# Patient Record
Sex: FEMALE | Race: WHITE
[De-identification: names, ages, dates, MRNs, and addresses within clinical notes are randomized per-mention and may not be internally consistent; named-entity substitution may affect disease eponyms.]

---

## 2017-01-07 ENCOUNTER — HOSPITAL ENCOUNTER (INPATIENT)
Dept: HOSPITAL 10 - FTE | Age: 52
LOS: 1 days | Discharge: HOME | DRG: 690 | End: 2017-01-08
Attending: INTERNAL MEDICINE | Admitting: INTERNAL MEDICINE
Payer: MEDICAID

## 2017-01-07 VITALS — TEMPERATURE: 99.7 F

## 2017-01-07 VITALS
HEIGHT: 62 IN | WEIGHT: 169.54 LBS | WEIGHT: 169.54 LBS | BODY MASS INDEX: 31.2 KG/M2 | BODY MASS INDEX: 31.2 KG/M2 | HEIGHT: 62 IN

## 2017-01-07 DIAGNOSIS — N13.6: Primary | ICD-10-CM

## 2017-01-07 DIAGNOSIS — R16.0: ICD-10-CM

## 2017-01-07 DIAGNOSIS — B95.7: ICD-10-CM

## 2017-01-07 DIAGNOSIS — N17.9: ICD-10-CM

## 2017-01-07 DIAGNOSIS — Z87.442: ICD-10-CM

## 2017-01-07 LAB
ADD UMIC: YES
ALBUMIN SERPL-MCNC: 3.7 G/DL (ref 3.3–4.9)
ALBUMIN/GLOB SERPL: 0.74 {RATIO}
ALP SERPL-CCNC: 209 IU/L (ref 42–121)
ALT SERPL-CCNC: 29 IU/L (ref 13–69)
ANION GAP SERPL CALC-SCNC: 20 MMOL/L (ref 8–16)
APTT BLD: 39.1 SEC (ref 25–35)
AST SERPL-CCNC: 37 IU/L (ref 15–46)
BACTERIA #/AREA URNS HPF: (no result) /[HPF]
BASOPHILS # BLD AUTO: 0 10^3/UL (ref 0–0.1)
BASOPHILS NFR BLD: 0.1 % (ref 0–2)
BILIRUB DIRECT SERPL-MCNC: 0 MG/DL (ref 0–0.2)
BILIRUB SERPL-MCNC: 0.2 MG/DL (ref 0.2–1.3)
BUN SERPL-MCNC: 15 MG/DL (ref 7–20)
CALCIUM SERPL-MCNC: 9.2 MG/DL (ref 8.4–10.2)
CHLORIDE SERPL-SCNC: 103 MMOL/L (ref 97–110)
CO2 SERPL-SCNC: 24 MMOL/L (ref 21–31)
COLOR UR: (no result)
CONDITION: 1
CREAT SERPL-MCNC: 1.17 MG/DL (ref 0.44–1)
EOSINOPHIL # BLD: 0 10^3/UL (ref 0–0.5)
EOSINOPHIL NFR BLD: 0.2 % (ref 0–7)
ERYTHROCYTE [DISTWIDTH] IN BLOOD BY AUTOMATED COUNT: 15.1 % (ref 11.5–14.5)
GLOBULIN SER-MCNC: 5 G/DL (ref 1.3–3.2)
GLUCOSE SERPL-MCNC: 101 MG/DL (ref 70–220)
GLUCOSE UR STRIP-MCNC: NEGATIVE %
HCT VFR BLD CALC: 33.7 % (ref 37–47)
HGB BLD-MCNC: 11.3 G/DL (ref 12–16)
INR PPP: 1.24
KETONES UR STRIP.AUTO-MCNC: 15 MG/DL
LH ANALYZER COMMENTS: 1
LYMPHOCYTES # BLD AUTO: 1.6 10^3/UL (ref 0.8–2.9)
LYMPHOCYTES NFR BLD AUTO: 14.1 % (ref 15–51)
MCH RBC QN AUTO: 30.9 PG (ref 29–33)
MCHC RBC AUTO-ENTMCNC: 33.6 G/DL (ref 32–37)
MCV RBC AUTO: 91.7 FL (ref 82–101)
MONOCYTES # BLD: 1.1 10^3/UL (ref 0.3–0.9)
MONOCYTES NFR BLD: 9.9 % (ref 0–11)
NEUTROPHILS # BLD: 8.6 10^3/UL (ref 1.6–7.5)
NEUTROPHILS NFR BLD AUTO: 75.7 % (ref 39–77)
NITRITE UR QL STRIP.AUTO: NEGATIVE
NRBC # BLD MANUAL: 0 10^3/UL (ref 0–0)
NRBC BLD QL: 0 /100WBC (ref 0–0)
PLATELET # BLD: 378 10^3/UL (ref 140–440)
PMV BLD AUTO: 7.7 FL (ref 7.4–10.4)
POTASSIUM SERPL-SCNC: 4.8 MMOL/L (ref 3.5–5.1)
PROT SERPL-MCNC: 8.7 G/DL (ref 6.1–8.1)
PROTHROMBIN TIME: 15.7 SEC (ref 12.2–14.2)
PT RATIO: 1.2
RBC # BLD AUTO: 3.67 10^6/UL (ref 4.2–5.4)
RBC # UR AUTO: (no result) /UL
RBC #/AREA URNS HPF: (no result) /HPF
SODIUM SERPL-SCNC: 142 MMOL/L (ref 135–144)
SQUAMOUS #/AREA URNS HPF: (no result) /[HPF]
URINE BILIRUBIN (DIP): NEGATIVE
URINE BLOOD (DIP) POC: (no result)
URINE TOTAL PROTEIN (DIP): (no result)
UROBILINOGEN UR STRIP-ACNC: (no result) (ref 0.1–1)
WBC # BLD AUTO: 11.4 10^3/UL (ref 4.8–10.8)
WBC # BLD: 11.4 10^3/UL (ref 4.8–10.8)
WBC # UR STRIP: (no result) /UL

## 2017-01-07 PROCEDURE — 87086 URINE CULTURE/COLONY COUNT: CPT

## 2017-01-07 PROCEDURE — 83605 ASSAY OF LACTIC ACID: CPT

## 2017-01-07 PROCEDURE — 85730 THROMBOPLASTIN TIME PARTIAL: CPT

## 2017-01-07 PROCEDURE — 81001 URINALYSIS AUTO W/SCOPE: CPT

## 2017-01-07 PROCEDURE — 83690 ASSAY OF LIPASE: CPT

## 2017-01-07 PROCEDURE — 71010: CPT

## 2017-01-07 PROCEDURE — 87040 BLOOD CULTURE FOR BACTERIA: CPT

## 2017-01-07 PROCEDURE — 85025 COMPLETE CBC W/AUTO DIFF WBC: CPT

## 2017-01-07 PROCEDURE — 81003 URINALYSIS AUTO W/O SCOPE: CPT

## 2017-01-07 PROCEDURE — 85610 PROTHROMBIN TIME: CPT

## 2017-01-07 PROCEDURE — 74176 CT ABD & PELVIS W/O CONTRAST: CPT

## 2017-01-07 PROCEDURE — 80053 COMPREHEN METABOLIC PANEL: CPT

## 2017-01-07 NOTE — ERA
ER Documentation


Chief Complaint


Date/Time


DATE: 17 


TIME: 23:27


Chief Complaint


HEADACHE, RT SIDE FLANK PAIN WITH PAINFUL URINATION, COUGH





HPI


This 51-year-old female presents with right flank pain and dysuria. Also has 

mild right anterior abdominal pain that is sharp and nonradiating. Has felt 

febrile diaphoretic and like she was going to pass out at home. Also has a 

cough.  She knows that she has kidney stones as she was diagnosed with them in 

the past.  Denies chest pain and shortness of breath.





ROS


All systems reviewed and are negative except as per history of present illness.





Medications


Home Meds


Active Scripts


Cephalexin* (Keflex*) 500 Mg Capsule, 500 MG PO BID for 10 Days, CAP


   Prov:ZIA CAZARES MD         10/15/16


Ondansetron (Ondansetron Odt) 4 Mg Tab.rapdis, 4 MG PO Q6H Y for NAUSEA AND/OR 

VOMITING, #10 TAB


   Prov:ZIA CAZARES MD         10/15/16


Hydrocodone/Acetaminophen (Norco  Tablet) 1 Each Tablet, 1 TAB PO Q6H Y 

for PAIN, #12 TAB


   Prov:ZIA CAZARES MD         10/15/16





Allergies


Allergies:  


Coded Allergies:  


     No Known Allergy (Unverified , 10/15/16)





PMhx/Soc


History of Surgery:  Yes ()


Anesthesia Reaction:  No


Hx Neurological Disorder:  No


Hx Respiratory Disorders:  No


Hx Cardiac Disorders:  Yes (HTN)


Hx Psychiatric Problems:  No


Hx Miscellaneous Medical Probl:  Yes (HERNIA; kidney stones)


Hx Alcohol Use:  Yes


Hx Substance Use:  No


Hx Tobacco Use:  No


Smoking Status:  Never smoker





Physical Exam


Vitals





Vital Signs








  Date Time  Temp Pulse Resp B/P Pulse Ox O2 Delivery O2 Flow Rate FiO2


 


17 22:05 99.7 93 18 135/72 99 Room Air  


 


17 14:38 101.0 109 18 164/82 98   








Physical Exam


Const:  []             Mild to moderate distress


Head:   Atraumatic 


Eyes:    Normal Conjunctiva


ENT:    Normal External Ears, Nose and Mouth.


Neck:               Full range of motion..~ No meningismus.


Resp:    Clear to auscultation bilaterally


Cardio:    Regular tachycardia no murmurs


Abd:    Soft, mild right lower quadrant abdominal tenderness without guarding 

or rebound, non distended. Normal bowel sounds


Skin:    No petechiae or rashes, very diaphoretic


Back:    No midline or flank tenderness


Ext:    No cyanosis, or edema


Neur:    Awake and alert oriented 3, no focal deficits


Psych:    Normal Mood and Affect


Result Diagram:  


17





Results 24 hrs





 Laboratory Tests








Test


  17


19:28 17


19:36 17


22:35


 


Alanine Aminotransferase


(ALT/SGPT) 29IU/L 


  


  


 


 


Albumin 3.7g/dl   


 


Albumin/Globulin Ratio 0.74   


 


Alkaline Phosphatase 209IU/L   


 


Anion Gap 20   


 


Aspartate Amino Transf


(AST/SGOT) 37IU/L 


  


  


 


 


Basophils # 0.010^3/ul   


 


Basophils % 0.1%   


 


Blood Morphology Comment    


 


Blood Urea Nitrogen 15mg/dl   


 


Calcium Level 9.2mg/dl   


 


Carbon Dioxide Level 24mmol/L   


 


Chloride Level 103mmol/L   


 


Creatinine 1.17mg/dl   


 


Direct Bilirubin 0.00mg/dl   


 


Eosinophils # 0.010^3/ul   


 


Eosinophils % 0.2%   


 


Globulin 5.00g/dl   


 


Glucose Level 101mg/dl   


 


Hematocrit 33.7%   


 


Hemoglobin 11.3g/dl   


 


Indirect Bilirubin 0.2mg/dl   


 


Lipase 31U/L   


 


Lymphocytes # 1.610^3/ul   


 


Lymphocytes % 14.1%   


 


Mean Corpuscular Hemoglobin 30.9pg   


 


Mean Corpuscular Hemoglobin


Concent 33.6g/dl 


  


  


 


 


Mean Corpuscular Volume 91.7fl   


 


Mean Platelet Volume 7.7fl   


 


Monocytes # 1.110^3/ul   


 


Monocytes % 9.9%   


 


Neutrophils # 8.610^3/ul   


 


Neutrophils % 75.7%   


 


Nucleated Red Blood Cells # 0.010^3/ul   


 


Nucleated Red Blood Cells % 0.0/100WBC   


 


Platelet Count 41137^3/UL   


 


Potassium Level 4.8mmol/L   


 


Red Blood Count 3.6710^6/ul   


 


Red Cell Distribution Width 15.1%   


 


Sodium Level 142mmol/L   


 


Total Bilirubin 0.2mg/dl   


 


Total Protein 8.7g/dl   


 


Urine Bacteria MODERATE   


 


Urine Bilirubin NEGATIVE   


 


Urine Clarity


  SLIGHTLY


CLOUDY 


  


 


 


Urine Color LT. YELLOW   


 


Urine Glucose NEGATIVE%   


 


Urine Hemoglobin 2+   


 


Urine Ketones 15   


 


Urine Leukocyte Esterase 1+   


 


Urine Microscopic RBC 0-2/HPF   


 


Urine Microscopic WBC 5-10/HPF   


 


Urine Nitrite NEGATIVE   


 


Urine Specific Gravity <=1.005   


 


Urine Squamous Epithelial


Cells MODERATE 


  


  


 


 


Urine Total Protein 1+   


 


Urine Urobilinogen 0.2  E.U./dL   


 


Urine pH 6.0   


 


White Blood Count 11.410^3/ul   


 


Bedside Urine Blood  2+  


 


Bedside Urine Glucose (UA)  Negative  


 


Bedside Urine Ketones (LAB)  Trace  


 


Bedside Urine Leukocyte


Esterase (L 


  1+ 


  


 


 


Bedside Urine Nitrite (LAB)  Negative  


 


Bedside Urine Protein (LAB)  2+  


 


Bedside Urine pH (LAB)  6.5  


 


Activated Partial


Thromboplast Time 


  


  39.1Sec 


 


 


INR International Normalized


Ratio 


  


  1.24 


 


 


Lactic Acid Level   0.6mmol/L 


 


Prothrombin Time   15.7Sec 


 


Prothrombin Time Ratio   1.2 








 Current Medications








 Medications


  (Trade)  Dose


 Ordered  Sig/Evelio


 Route


 PRN Reason  Start Time


 Stop Time Status Last Admin


Dose Admin


 


 Sodium Chloride


  (NS)  1,000 ml @ 


 1,000 mls/hr  Q1H STAT


 IV


   17 19:18


 17 20:17 DC 17 19:43


 


 


 Morphine Sulfate


  (morphine)  4 mg  ONCE  STAT


 IV


   17 19:18


 17 19:20 DC 17 19:42


 


 


 Ondansetron HCl


  (Zofran Inj)  4 mg  ONCE  STAT


 IV


   17 19:18


 17 19:20 DC 17 19:41


 


 


 Sodium Chloride


 2290 ml  2,290 ml  BOLUS OVER 2 HOURS STAT


 IV*


   17 21:58


 17 21:59 DC 17 22:13


 


 


 Cefepime HCl


  (Maxipime 2gm/50


 ml (Pmx))  50 ml @ 


 100 mls/hr  ONCE  STAT


 IVPB


   17 21:58


 17 22:27 DC  


 


 


 Ondansetron HCl


  (Zofran Inj)  4 mg  BRIDGE ORDER PRN


 IV


 NAUSEA AND/OR VOMITING  17 23:30


 17 23:29   


 


 


 Acetaminophen


  (Tylenol Tab)  650 mg  ER BRIDGE PRN


 PO


 MILD PAIN/FEVER  17 23:30


 17 23:29   


 











Procedures/MDM


31-year-old female meets sepsis criteria and has multiple kidney stones 

persistent moderate to severe hydronephrosis and no movement of large stones in 

her distal right ureter. She has symptoms and CAT scan consistent with 

pyelonephritis. She looked very ill when she arrived. She is given a dose of 4 

morphine which helped control her pain for a while. She again had return of 

pain was given diclofenac and morphine. She had nausea vomiting that was 

improved by Zofran. Is given multiple liters of IV fluid consistent with sepsis 

protocol and cefepime IV.  Tachycardia has improved and vital signs are 

currently stabilized. I spoke with Dr. Lombardi will be admitting the patient. I 

also spoke to the on-call urologist Dr. Nicole, who states that Dr. Lombardi can 

call him but his expertise as needed.





CT abdomen and pelvis interpretation: Multiple obstructive calculi not moving 

since CT in October, large right staghorn calculus perinephric inflammation and 

edema is increased. The prior CT. No obstruction, no free air. No fractures








Critical care time 36 minutes: Includes time spent management pyelonephritis 

with sepsis in a very ill-appearing patient initially, antibiotic administration

, careful fluid administration, multiple visits the patient bedside assess 

status, multiple doses of pain medication, chart review, discussion with patient

, admitting doctor, urologist. This does not include any billable procedures





Departure


Diagnosis:  


 Primary Impression:  


 Pyelonephritis


 Additional Impressions:  


 Sepsis


 Renal insufficiency


 Ureterolithiasis


Condition:  Serious











NIKOLAY FRASER DO 2017 23:35

## 2017-01-07 NOTE — RADRPT
PROCEDURE:   XR Chest. 

 

CLINICAL INDICATION:  Cough for 4 days.

 

TECHNIQUE:   Single frontal view  of the chest was obtained 

 

COMPARISON:   None 

 

FINDINGS:

Cardiomegaly. 

Slight atelectasis at the left lung base.  Lungs are otherwise substantially clear. Hypoinflated heidy
gs accentuate pulmonary vascular markings.

There is no pleural effusion or pneumothorax.   

 

 

IMPRESSION:

Slight atelectasis at the left lung base, and otherwise, no evident acute cardiopulmonary disease.

 

RPTAT: UU

_____________________________________________ 

Physician Hieu           Date    Time 

Electronically viewed and signed by Physician Hieu on 01/07/2017 21:16 

 

D:  01/07/2017 21:16  T:  01/07/2017 21:16

RS/

## 2017-01-07 NOTE — RADRPT
PROCEDURE:   CT Abdomen and Pelvis without contrast. 

 

CLINICAL INDICATION:    Abdominal pain

 

TECHNIQUE:   CT of the abdomen and pelvis was performed on a multi-detector scanner without IV contr
ast.  Coronal and sagittal images were reformatted from the axial data set.  One or more of the foll
owing dose reduction techniques were used: automated exposure control,  adjustment of the mA and/or 
kV according to patient size, use of iterative reconstruction technique.  CTDI = 10.48 mGy. DLP = 63
7.94 mGy-cm.

 

COMPARISON:   CT, 10/15/2016 

 

FINDINGS:

 

CT abdomen:

 

There is mild bibasilar atelectasis.  The heart size is normal, without pericardial effusion.  The l
iver is enlarged (19.5 cm) and fatty infiltrated, without evidence of focal mass.  Gallbladder, bili
alexsandra tree, pancreas, spleen and adrenal glands are unremarkable.  At least 3 obstructive calculi are 
present within the distal right ureter, with the largest measuring 11 mm (3-146).  There is moderate
 to severe dilatation of the right ureter proximally.  Large right-sided staghorn calculus is presen
t measuring 4.1 cm in maximal dimension.  Mild right perinephric inflammation/edema is seen, increas
ed from the prior CT.  Left kidney demonstrates mild hydronephrosis, without evidence of obstructive
 calculus or ureteral dilatation, similar in appearance to prior CT, suggestive of chronic mild left
 UPJ obstruction.  The stomach is partially collapsed, but appears grossly unremarkable.

 

The aorta is of normal caliber.  Multiple enlarged periaortic retroperitoneal lymph nodes are identi
fied measuring up to 12 x 11 mm in the aortocaval space (3-98).  The aaron hepatis region is clear. 
 

 

CT pelvis:

 

There is no bowel obstruction, free intraperitoneal air or abscess.  No diverticulosis, diverticulit
is, colitis or appendicitis is identified.  Urinary bladder, uterus and adnexa are grossly unremarka
ble.  No pelvic mass, free fluid or lymphadenopathy is seen.

 

The surrounding osseous structures are remarkable for mild degenerative spondylosis of the spine.  N
o osteolytic or osteoblastic lesion is detected.  

 

IMPRESSION:

 

1.  Multiple obstructive calculi are again noted in the distal right ureter, similar in appearance t
o prior CT.  There is moderate to severe dilatation of the right ureter proximally, grossly stable. 
 Large right staghorn calculus is again noted, also grossly unchanged.  

2.  Mild amount of perinephric inflammation/edema is seen on the right, increased when compared to t
he prior CT - developing pyelonephritis is not excluded.

3.  Prominent periaortic retroperitoneal lymph nodes are noted, similar in appearance to prior CT, l
ikely chronic and reactive.

4.  Left kidney demonstrates findings suggestive of chronic mild UPJ obstruction, similar in appeara
nce to prior exam.

5.  Hepatic steatosis and mild hepatomegaly are noted.

 

RPTAT: HDWR

_____________________________________________ 

.Arnie Trujillo MD, MD           Date    Time 

Electronically viewed and signed by .Arnie Trujillo MD, MD on 01/07/2017 20:41 

 

D:  01/07/2017 20:41  T:  01/07/2017 20:41

.R/

## 2017-01-08 VITALS — SYSTOLIC BLOOD PRESSURE: 156 MMHG | DIASTOLIC BLOOD PRESSURE: 77 MMHG | HEART RATE: 86 BPM | RESPIRATION RATE: 20 BRPM

## 2017-01-08 VITALS — SYSTOLIC BLOOD PRESSURE: 155 MMHG | DIASTOLIC BLOOD PRESSURE: 86 MMHG | RESPIRATION RATE: 20 BRPM

## 2017-01-08 VITALS — SYSTOLIC BLOOD PRESSURE: 155 MMHG | RESPIRATION RATE: 20 BRPM | HEART RATE: 96 BPM | DIASTOLIC BLOOD PRESSURE: 86 MMHG

## 2017-01-08 NOTE — HP
DATE OF ADMISSION: 01/07/2017

 

CHIEF COMPLAINT:  Right flank pain.

 

HISTORY OF PRESENT ILLNESS:  The patient is a 51-year-old female with an episode of nephrolithiasis 
approximately 1 year ago that resolved on its own at that time.  The patient has no other reported m
edical history.  The patient presents with 6 days of flank pain on the right side.  She also has dys
uria.  The patient reports feeling febrile and diaphoretic and felt as though she was going to pass 
out at home.  The patient has no other complaints at this time.

 

PAST MEDICAL HISTORY:  History of nephrolithiasis that appears to have resolved either on its own or
 with IV fluids.

 

PAST SURGICAL HISTORY:  Three C-sections.

 

HOME MEDICATIONS:  

1.  Keflex.

2.  Zofran.

3.  Norco.

 

ALLERGIES:  NO KNOWN DRUG ALLERGIES.

 

FAMILY HISTORY:  Denies.

 

SOCIAL HISTORY:  Denies alcohol, tobacco, or drug abuse.

 

REVIEW OF SYSTEMS:  A 12-point review of systems negative except for that discussed in HPI.

 

PHYSICAL EXAMINATION:

VITAL SIGNS:  Temperature is 98.9, pulse 96, respiratory rate 20, blood pressure is 155/86, saturati
on 96% on room air.

GENERAL:  No acute distress, alert and oriented.

HEENT:  Normocephalic, atraumatic.

LUNGS:  Clear to auscultation.

CARDIOVASCULAR:  Regular rate and rhythm.

ABDOMEN:  Nondistended, nontender, soft.

EXTREMITIES:  No clubbing, cyanosis, or edema.

 

LABORATORIES:  White count is 11.4, hemoglobin is 11.3, platelets are 378.  Chemistry within normal 
limits except for anion gap of 20, creatinine is 1.17.  Alkaline phosphatase 209.  Globulin was 5.0.
  INR is 1.24.  UA shows 1+ leukocyte esterase, WBCs 5 to 10.

 

DIAGNOSTICS:  Abdominopelvic CT shows multiple obstructive calculi noted in the distal right ureter 
similar in appearance from prior CT.  There is moderate to severe dilation of the right ureter proxi
dash, grossly stable, large right staghorn calculus is again noted, also grossly unchanged.  There 
is a mild amount of perinephric inflammation and edema seen in the right increased when compared to 
prior CT.  Developing pyelonephritis is not excluded.  Prominent periaortic retroperitoneal lymph no
evie are noted with similar appearance to prior CT, likely chronic and reactive.  Left kidney demonst
rates findings suggestive of chronic mild UPJ obstruction, similar appearance to prior examination. 
 There is hepatic steatosis and mild hepatomegaly noted.  Chest x-ray shows slight atelectasis at le
ft base.  Otherwise, no evidence of acute cardiopulmonary disease.

 

ASSESSMENT AND PLAN

1.  Nephrolithiasis.  The patient does have moderate to severe dilation of the right ureter, and onc
e again, the right staghorn calculus is again noted.  This was apparently noted also in October 2016
.  We will treat with IV fluids.  We will get a urology consultation.  Urology was called in the ER 
and will likely follow the phone call by the primary team.  The patient also has a left kidney stone
 with mild UPJ obstruction, once again with similar appearance to the prior examination in October 2
016.  We will treat with IV fluids, give morphine for pain.  The patient's main pain is on the right
 side at this time.  

2.  Urinary tract infection, likely secondary to urinary stasis from nephrolithiasis from septic sto
ne.  We will obtain a urine culture.  We will treat with Rocephin empirically and follow up on urine
 culture.

3.  Leukocytosis likely secondary to obstructive renal stones.  The patient's stones in the kidneys 
have been present for some time now and are likely the cause of this UTI and this leukocytosis

4.  Acute kidney injury, likely postobstructive from stones.  Monitor.

5.  Prophylaxis.  SCDs.

 

 

Dictated By: TODD VALERA/QUETA

DD:    01/08/2017 07:47:41

DT:    01/08/2017 09:45:04

Conf#: [id number]

DID#:  [id number]

## 2017-01-08 NOTE — DS
DATE OF ADMISSION: 2017

DATE OF DISCHARGE: 2017

 

DISCHARGE DIAGNOSES:  

1.  Nephrolithiasis. 

2.  Staphylococcus urinary tract infection secondary to nephrolithiasis. 

3.  Leukocytosis. 

4.  Acute kidney injury. 

5.  Acute renal failure. 

 

HOSPITAL COURSE:  This is a 51-year-old female with a previous history of nephrolithiasis approximat
ely 1 year ago, resolved on its own at the time.  No other medical history.  Patient presents with 6
 days of flank pain, was admitted to med/surg for further evaluation and treatment.  Urology was con
sulted.  

 

Laboratory findings showed a BUN and creatinine 15 and 1.17, alkaline phosphatase of 209, total prot
ein 8.7, lactic acid 0.6 to 0.7.  WBC is 11.4, H and H 11.3 and 33.7, platelets of 378,000.  Coags s
howed INR 1.24, PT of 15.7.  Urine showed specific gravity less than 1.005.  Urine protein 2+, keton
es 15.  Urine ketones trace.  Urine blood is 2+, nitrites negative, urine nitrites are negative, pranay
irubin negative, leukocyte esterase 1+.  RBC 0 to 2, WBCs 5 to 10, bacteria moderate.  Hemoglobin 2+
.  

 

The patient did have a chest x-ray showing slight atelectasis at the left lung base and otherwise no
 evident acute cardiopulmonary disease.  

 

Had a CT abdomen and pelvis showin.  Multiple obstructive calculi again noted in the distal right ureter, similar in appearance to pr
ior CT.  There is moderate to severe dilatation of ureteral proximally, grossly stable.  Large right
 staghorn calculus is noted, also grossly unchanged.

2.  Mild amount of perinephric inflammation and edema was seen on the right, increased compared to t
he prior CT.  Developing pyelonephritis is not excluded.

3.  Prominent periaortic intraperitoneal lymph nodes are noted, similar in appearance to prior CT, l
ikely chronic and reactive.

4.  Left kidney demonstrates findings suggestive of chronic mild UPJ obstruction, similar in appeara
nce to prior exam.

5.  Hepatic steatosis and mild hepatic hepatomegaly are noted.  Subsequently, patient was seen by ur
ology and recommended outpatient followup at this time.  We will continue with p.o. antibiotics and 
Flomax as per recommendations, and pain management.

 

DISPOSITION:  Home.

 

CONDITION:  Stable.

 

DISCHARGE MEDICATIONS:  Will include:

1.  Flomax 0.4 mg p.o. at bedtime.  

2.  Toradol 30 mg p.o. q.6 h.  

3.  Ciprofloxacin 500 mg p.o. b.i.d. for the next 7 days.

 

FOLLOWUP:  The patient will follow up again with Dr. Nicole in his office within a week, will follow w
ith primary care physician within a week.  Patient and consultants made aware of this.

 

COORDINATION OF DISCHARGE:  Greater than 40 minutes.

 

 

Dictated By: KAUSHAL TIWARI/QUETA

DD:    2017 14:20:46

DT:    2017 14:53:55

Conf#: [id number]

DID#:  [id number]

## 2017-01-08 NOTE — PDOCDIS
Discharge Instructions


DIAGNOSIS


Discharge Diagnosis:  Nephrolithiasis





CONDITION


Patient Condition:  Stable





HOME CARE INSTRUCTIONS:


Diet Instructions:  Low Fat /Cholesterol





ACTIVITY:








Activity Restrictions:   Slowly Increase Activity





 Rest between Activity





 Avoid heavy lifting











FOLLOW UP/APPOINTMENTS


Appointments


Follow up with Dr. Nicole in next couple of days. 


Hammad Nicole MD Specialty: Urology Comments: Office Address: [id number] [place] [id number] Office Telephone: [phone] Office Fax: [phone] 

: Carmen Jones








follow up with primary care physician in one week.





OTHER ORDERS:


Other Orders:


Nephrolithiasis - take the medications as prescribed - flomax daily, 

ciprofloxacin for 7 days, and toradol max days 4











KAUSHAL MICHEL MD Jan 8, 2017 14:34

## 2017-01-08 NOTE — CONS
Date/Time of Note


Date/Time of Note


DATE: 1/8/17 


TIME: 12:03





Assessment/Plan


Assessment/Plan


Chief Complaint/Hosp Course


Admitted for pain which is now resolved


No sign of sepsis


Normal white count


Urinalysis is going to be expected to be abnormal however we will await results 

of the urine culture.


Stone in the kidney could be infected


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 

the urgent problem but it needs to be addressed


She has an obstructing distal stone that is either one stone or a clump of 

stones measuring about 10 mm


She has passed stones in the past


If it is a clump of smaller stones she may pass this





In my opinion she can go home on Flomax and pain medication


Urine culture should be checked and she should be notified if the culture is 

positive otherwise antibiotics are not necessary





I told her that the staghorn calculus should be treated.  It can be done by 

percutaneous approach.  We discussed going to [facility] hosp for the large 

procedure.  If she is unable to pass the distal stone she can fill me in the 

office if needed


Problems:  





Consultation Date/Type/Reason


Admit Date/Time


Jan 7, 2017 at 23:18





Hx of Present Illness


Thank you for request for evaluation





This lady was admitted with flank pain


CT scan shows a very large staghorn calculus right kidney with obstruction at 

the distal portion of the ureter measuring about 10 mm.  This stone may be a 

cluster of smaller stones it is unclear


She has passed stones in the past on numerous occasions


She has never had kidney stone surgery


She was already aware of the staghorn calculus from a previous visit to the 

emergency room October 2016


Though she felt "warm on the inside" there was no fever or chills


He denies she is feeling fine


No nausea no vomiting


No voiding dysfunction


Urine culture pending





Social History


Smoking Status:  Never smoker





Exam/Review of Systems


Vital Signs


Vitals





 Vital Signs








  Date Time  Temp Pulse Resp B/P Pulse Ox O2 Delivery O2 Flow Rate FiO2


 


1/8/17 08:00 98.9 86 20 156/77 96 Room Air  














 Intake and Output   


 


 1/7/17 1/7/17 1/8/17





 15:00 23:00 07:00


 


Intake Total   0 ml


 


Balance   0 ml











Exam


Constitutional:  alert, oriented, well developed


Psych:  no complaints


Head:  normocephalic


Neck:  supple


Respiratory:  normal air movement


Gastrointestinal:  other (No tenderness moderate obesity), soft


Extremities:  other (No tenderness or edema)





Results


Result Diagram:  


1/7/17 1928 1/7/17 1928





Results 24 hrs





Laboratory Tests








Test


  1/7/17


19:28 1/7/17


19:36 1/7/17


22:35 1/8/17


02:43


 


Alanine Aminotransferase


(ALT/SGPT) 29  


  


  


  


 


 


Albumin 3.7     


 


Albumin/Globulin Ratio 0.74     


 


Alkaline Phosphatase 209  H   


 


Anion Gap 20  H   


 


Aspartate Amino Transf


(AST/SGOT) 37  


  


  


  


 


 


Basophils # 0.0     


 


Basophils % 0.1     


 


Blood Morphology Comment      


 


Blood Urea Nitrogen 15     


 


Calcium Level 9.2     


 


Carbon Dioxide Level 24     


 


Chloride Level 103     


 


Creatinine 1.17  H   


 


Direct Bilirubin 0.00     


 


Eosinophils # 0.0     


 


Eosinophils % 0.2     


 


Globulin 5.00  H   


 


Glucose Level 101     


 


Hematocrit 33.7  L   


 


Hemoglobin 11.3  L   


 


Indirect Bilirubin 0.2     


 


Lipase 31     


 


Lymphocytes # 1.6     


 


Lymphocytes % 14.1  L   


 


Mean Corpuscular Hemoglobin 30.9     


 


Mean Corpuscular Hemoglobin


Concent 33.6  


  


  


  


 


 


Mean Corpuscular Volume 91.7     


 


Mean Platelet Volume 7.7     


 


Monocytes # 1.1  H   


 


Monocytes % 9.9     


 


Neutrophils # 8.6  H   


 


Neutrophils % 75.7     


 


Nucleated Red Blood Cells # 0.0     


 


Nucleated Red Blood Cells % 0.0     


 


Platelet Count 378  #   


 


Potassium Level 4.8     


 


Red Blood Count 3.67  L   


 


Red Cell Distribution Width 15.1  H   


 


Sodium Level 142     


 


Total Bilirubin 0.2     


 


Total Protein 8.7  H   


 


Urine Bacteria MODERATE     


 


Urine Bilirubin NEGATIVE     


 


Urine Clarity


  SLIGHTLY


CLOUDY 


  


  


 


 


Urine Color LT. YELLOW     


 


Urine Glucose NEGATIVE     


 


Urine Hemoglobin 2+  H   


 


Urine Ketones 15     


 


Urine Leukocyte Esterase 1+  H   


 


Urine Microscopic RBC 0-2     


 


Urine Microscopic WBC 5-10     


 


Urine Nitrite NEGATIVE     


 


Urine Specific Gravity <=1.005  L   


 


Urine Squamous Epithelial


Cells MODERATE  


  


  


  


 


 


Urine Total Protein 1+  H   


 


Urine Urobilinogen 0.2  E.U./dL     


 


Urine pH 6.0     


 


White Blood Count 11.4  H   


 


Bedside Urine Blood  2+  H  


 


Bedside Urine Glucose (UA)  Negative    


 


Bedside Urine Ketones (LAB)  Trace  H  


 


Bedside Urine Leukocyte


Esterase (L 


  1+  H


  


  


 


 


Bedside Urine Nitrite (LAB)  Negative    


 


Bedside Urine Protein (LAB)  2+  H  


 


Bedside Urine pH (LAB)  6.5    


 


Activated Partial


Thromboplast Time 


  


  39.1  H


  


 


 


INR International Normalized


Ratio 


  


  1.24  


  


 


 


Lactic Acid Level   0.6   0.7  


 


Prothrombin Time   15.7  H 


 


Prothrombin Time Ratio   1.2   














Test


  1/8/17


04:43 


  


  


 


 


Lactic Acid Level 0.7     











Medications


Medications





 Current Medications


Sodium Chloride (1/2 NS) 1,000 ml @  125 mls/hr Q8H IV  Last administered on 1/8 /17at 09:35; Admin Dose 125 MLS/HR;  Start 1/8/17 at 07:01


Ondansetron HCl (Zofran Inj) 4 mg Q6H  PRN IV NAUSEA AND/OR VOMITING;  Start 1/8 /17 at 07:30


Acetaminophen (Tylenol Tab) 650 mg Q6H  PRN PO PAIN LEVEL 1-3 OR FEVER;  Start 1 /8/17 at 07:30


Acetaminophen/ Hydrocodone Bitart (Norco (5/325)) 1 tab Q6H  PRN PO MODERATE 

PAIN LEVEL 4-6;  Start 1/8/17 at 07:30


Morphine Sulfate (morphine) 2 mg Q4H  PRN IV SEVERE PAIN LEVEL 7-10;  Start 1/8/ 17 at 07:30


Docusate Sodium (Colace) 100 mg Q12H  PRN PO CONSTIPATION;  Start 1/8/17 at 07:

30


Zolpidem Tartrate 5 mg 5 mg QHS  PRN PO SLEEP;  Start 1/8/17 at 07:30


Ceftriaxone Sodium (Rocephin) 50 ml @  100 mls/hr Q24H IVPB  Last administered 

on 1/8/17at 09:35; Admin Dose 100 MLS/HR;  Start 1/8/17 at 07:30











ENIO BARRAGAN MD Jan 8, 2017 12:07

## 2019-07-30 ENCOUNTER — HOSPITAL ENCOUNTER (EMERGENCY)
Dept: HOSPITAL 10 - FTE | Age: 54
Discharge: HOME | End: 2019-07-30
Payer: MEDICAID

## 2019-07-30 ENCOUNTER — HOSPITAL ENCOUNTER (EMERGENCY)
Dept: HOSPITAL 91 - FTE | Age: 54
Discharge: HOME | End: 2019-07-30
Payer: MEDICAID

## 2019-07-30 VITALS
HEIGHT: 63 IN | HEIGHT: 63 IN | BODY MASS INDEX: 29.14 KG/M2 | WEIGHT: 164.46 LBS | WEIGHT: 164.46 LBS | BODY MASS INDEX: 29.14 KG/M2

## 2019-07-30 DIAGNOSIS — N64.4: Primary | ICD-10-CM

## 2019-07-30 PROCEDURE — 99282 EMERGENCY DEPT VISIT SF MDM: CPT

## 2019-07-30 NOTE — ERD
ER Documentation


Chief Complaint


Chief Complaint





left breast pain x5 days





HPI


54-year-old female presenting with left breast pain x5 days.  Patient states 


that she has not noted any swelling or bleeding from her nipple.  She has not 


taken medications.  She states that she had her last mammogram 2 years ago which


was within normal limits.  No fevers.  Denies medical problems.  NKDA.  Surgical


history .  Social history denies





ROS


All systems reviewed and are negative except as per history of present illness.





Medications


Home Meds


Active Scripts


Naproxen* (Naprosyn*) 500 Mg Tablet, 500 MG PO BID PRN for PAIN AND/OR 


INFLAMMATION, #30 TAB


   Prov:LUTHER ALVARADO PA-C         19


Ciprofloxacin Hcl* (Ciprofloxacin Hcl*) 500 Mg Tablet, 500 MG PO BID, #14 TAB


   Prov:KAUSHAL MICHEL MD         17


Ondansetron (Ondansetron Odt) 4 Mg Tab.rapdis, 4 MG PO Q6H PRN for NAUSEA AND/OR


VOMITING, #10 TAB


   Prov:ZIA CAZARES MD         10/15/16





Allergies


Allergies:  


Coded Allergies:  


     No Known Allergy (Unverified , 10/15/16)





PMhx/Soc


History of Surgery:  Yes (C section x3)


Anesthesia Reaction:  No


Hx Neurological Disorder:  No


Hx Respiratory Disorders:  No


Hx Cardiac Disorders:  No


Hx Psychiatric Problems:  No


Hx Miscellaneous Medical Probl:  No


Hx Alcohol Use:  Yes (sometimes)


Hx Substance Use:  No


Hx Tobacco Use:  No


Smoking Status:  Never smoker





FmHx


Family History:  No diabetes, No coronary disease, No other





Physical Exam


Vitals





Vital Signs


  Date      Temp  Pulse  Resp  B/P (MAP)   Pulse Ox  O2          O2 Flow    FiO2


Time                                                 Delivery    Rate


   19  98.4     89    18      147/86        97


     12:04                          (106)





Physical Exam


GENERAL: The patient is well-appearing, well-nourished, in no acute distress


CHEST: Clear to auscultation bilaterally.  There are no rales, wheezes or 


rhonchi.


HEART: Regular rate and rhythm.  No murmurs, clicks, rubs or gallops.  No S3 or 


S4.


ABDOMEN:Soft, nontender and nondistended.  Good bowel sounds.  No rebound or 


guarding.  No gross peritonitis.  No gross organomegaly or masses.  


BREAST: No swelling or erythema noted to the breast.  No tenderness to 


palpation.  No erythema or masses noted.





Procedures/MDM


MDM: 54-year-old female presenting with breast pain.  I have low suspicion for 


infectious process.  I have a low suspicion for mass.  Patient is discharged 


with strict ER precautions and told to follow-up with primary care within 1 to 2


days for close evaluation.  Patient is told symptoms change or worsen to return 


immediately to the ER.  All questions answered at discharge





Departure


Diagnosis:  


   Primary Impression:  


   Breast pain


Condition:  Stable


Patient Instructions:  Breast Self-Exam (BSE)


Referrals:  


[facility]


YOU HAVE RECEIVED A MEDICAL SCREENING EXAM AND THE RESULTS INDICATE THAT YOU DO 


NOT HAVE A CONDITION THAT REQUIRES URGENT TREATMENT IN THE EMERGENCY DEPARTMENT.





FURTHER EVALUATION AND TREATMENT OF YOUR CONDITION CAN WAIT UNTIL YOU ARE SEEN 


IN YOUR DOCTORS OFFICE WITHIN THE NEXT 1-2 DAYS. IT IS YOUR RESPONSIBILITY TO 


MAKE AN APPOINTMENT FOR FOLOW-UP CARE.





IF YOU HAVE A PRIMARY DOCTOR


--you should call your primary doctor and schedule an appointment





IF YOU DO NOT HAVE A PRIMARY DOCTOR YOU CAN CALL OUR PHYSICIAN REFERRAL HOTLINE 


AT


 [phone] 





IF YOU CAN NOT AFFORD TO SEE A PHYSICIAN YOU CAN CHOSE FROM THE FOLLOWING 


[facility] CLINICS





[facility] [phone] [address] [facility]. [facility] [phone] [address] [facility]. [facility] [phone]


[address] [facility] [phone] [address] [facility]. [facility] [phone] [address] [facility]. [facility]. [phone]


[address] [place]





Additional Instructions:  


FOLLOW UP WITH YOUR PRIMARY CARE PHYSICIAN TOMORROW.Return to this facility if 


you are not improving as expected.











LUTHER ALVARADO PA-C       2019 14:19

## 2019-08-26 ENCOUNTER — HOSPITAL ENCOUNTER (EMERGENCY)
Dept: HOSPITAL 91 - E/R | Age: 54
Discharge: HOME | End: 2019-08-26
Payer: MEDICAID

## 2019-08-26 ENCOUNTER — HOSPITAL ENCOUNTER (EMERGENCY)
Dept: HOSPITAL 10 - E/R | Age: 54
Discharge: HOME | End: 2019-08-26
Payer: MEDICAID

## 2019-08-26 VITALS — WEIGHT: 176.37 LBS | HEIGHT: 55 IN | BODY MASS INDEX: 40.82 KG/M2

## 2019-08-26 DIAGNOSIS — M25.562: Primary | ICD-10-CM

## 2019-08-26 PROCEDURE — 73562 X-RAY EXAM OF KNEE 3: CPT

## 2019-08-26 PROCEDURE — 99283 EMERGENCY DEPT VISIT LOW MDM: CPT

## 2019-09-04 ENCOUNTER — HOSPITAL ENCOUNTER (EMERGENCY)
Dept: HOSPITAL 91 - E/R | Age: 54
LOS: 1 days | Discharge: HOME | End: 2019-09-05
Payer: MEDICAID

## 2019-09-04 ENCOUNTER — HOSPITAL ENCOUNTER (EMERGENCY)
Dept: HOSPITAL 10 - E/R | Age: 54
LOS: 1 days | Discharge: HOME | End: 2019-09-05
Payer: MEDICAID

## 2019-09-04 VITALS
BODY MASS INDEX: 30.72 KG/M2 | BODY MASS INDEX: 30.72 KG/M2 | HEIGHT: 61 IN | WEIGHT: 162.7 LBS | WEIGHT: 162.7 LBS | HEIGHT: 61 IN

## 2019-09-04 DIAGNOSIS — R10.12: Primary | ICD-10-CM

## 2019-09-04 DIAGNOSIS — I10: ICD-10-CM

## 2019-09-04 LAB — ADD MAN DIFF?: NO

## 2019-09-04 PROCEDURE — 80048 BASIC METABOLIC PNL TOTAL CA: CPT

## 2019-09-04 PROCEDURE — 87086 URINE CULTURE/COLONY COUNT: CPT

## 2019-09-04 PROCEDURE — 76775 US EXAM ABDO BACK WALL LIM: CPT

## 2019-09-04 PROCEDURE — 96374 THER/PROPH/DIAG INJ IV PUSH: CPT

## 2019-09-04 PROCEDURE — 99285 EMERGENCY DEPT VISIT HI MDM: CPT

## 2019-09-04 PROCEDURE — 81001 URINALYSIS AUTO W/SCOPE: CPT

## 2019-09-04 PROCEDURE — 85025 COMPLETE CBC W/AUTO DIFF WBC: CPT

## 2019-09-04 PROCEDURE — 36415 COLL VENOUS BLD VENIPUNCTURE: CPT

## 2019-09-04 PROCEDURE — 96375 TX/PRO/DX INJ NEW DRUG ADDON: CPT

## 2019-09-04 RX ADMIN — KETOROLAC TROMETHAMINE 1 MG: 30 INJECTION, SOLUTION INTRAMUSCULAR at 23:30

## 2019-09-04 RX ADMIN — ONDANSETRON HYDROCHLORIDE 1 MG: 2 INJECTION, SOLUTION INTRAMUSCULAR; INTRAVENOUS at 23:30

## 2019-09-05 VITALS — HEART RATE: 78 BPM | RESPIRATION RATE: 14 BRPM | DIASTOLIC BLOOD PRESSURE: 85 MMHG | SYSTOLIC BLOOD PRESSURE: 137 MMHG

## 2019-09-05 LAB
ADD UMIC: YES
ANION GAP: 8 (ref 5–13)
BASOPHIL #: 0.1 10^3/UL (ref 0–0.1)
BASOPHILS %: 0.4 % (ref 0–2)
BLOOD UREA NITROGEN: 17 MG/DL (ref 7–20)
CALCIUM: 9.3 MG/DL (ref 8.4–10.2)
CARBON DIOXIDE: 28 MMOL/L (ref 21–31)
CHLORIDE: 102 MMOL/L (ref 97–110)
CREATININE: 0.96 MG/DL (ref 0.44–1)
EOSINOPHILS #: 0.3 10^3/UL (ref 0–0.5)
EOSINOPHILS %: 2.3 % (ref 0–7)
GLUCOSE: 103 MG/DL (ref 70–220)
HEMATOCRIT: 40.1 % (ref 37–47)
HEMOGLOBIN: 12.9 G/DL (ref 12–16)
IMMATURE GRANS #M: 0.06 10^3/UL (ref 0–0.03)
IMMATURE GRANS % (M): 0.5 % (ref 0–0.43)
LYMPHOCYTES #: 2.9 10^3/UL (ref 0.8–2.9)
LYMPHOCYTES %: 23.6 % (ref 15–51)
MEAN CORPUSCULAR HEMOGLOBIN: 31.6 PG (ref 29–33)
MEAN CORPUSCULAR HGB CONC: 32.2 G/DL (ref 32–37)
MEAN CORPUSCULAR VOLUME: 98.3 FL (ref 82–101)
MEAN PLATELET VOLUME: 10.5 FL (ref 7.4–10.4)
MONOCYTE #: 0.8 10^3/UL (ref 0.3–0.9)
MONOCYTES %: 6.9 % (ref 0–11)
NEUTROPHIL #: 8 10^3/UL (ref 1.6–7.5)
NEUTROPHILS %: 66.3 % (ref 39–77)
NUCLEATED RED BLOOD CELLS #: 0 10^3/UL (ref 0–0)
NUCLEATED RED BLOOD CELLS%: 0 /100WBC (ref 0–0)
PLATELET COUNT: 283 10^3/UL (ref 140–415)
POTASSIUM: 4.1 MMOL/L (ref 3.5–5.1)
RED BLOOD COUNT: 4.08 10^6/UL (ref 4.2–5.4)
RED CELL DISTRIBUTION WIDTH: 13.3 % (ref 11.5–14.5)
SODIUM: 138 MMOL/L (ref 135–144)
UR ASCORBIC ACID: NEGATIVE MG/DL
UR BACTERIA: (no result) /HPF
UR BILIRUBIN (DIP): NEGATIVE MG/DL
UR BLOOD (DIP): (no result) MG/DL
UR CLARITY: CLEAR
UR COLOR: (no result)
UR GLUCOSE (DIP): NEGATIVE MG/DL
UR KETONES (DIP): NEGATIVE MG/DL
UR LEUKOCYTE ESTERASE (DIP): (no result) LEU/UL
UR NITRITE (DIP): NEGATIVE MG/DL
UR PH (DIP): 6 (ref 5–9)
UR RBC: 8 /HPF (ref 0–5)
UR SPECIFIC GRAVITY (DIP): 1 (ref 1–1.03)
UR SQUAMOUS EPITHELIAL CELL: (no result) /HPF
UR TOTAL PROTEIN (DIP): NEGATIVE MG/DL
UR UROBILINOGEN (DIP): NEGATIVE MG/DL
UR WBC: 1 /HPF (ref 0–5)
WHITE BLOOD COUNT: 12.1 10^3/UL (ref 4.8–10.8)